# Patient Record
Sex: FEMALE | Race: WHITE
[De-identification: names, ages, dates, MRNs, and addresses within clinical notes are randomized per-mention and may not be internally consistent; named-entity substitution may affect disease eponyms.]

---

## 2017-02-15 ENCOUNTER — HOSPITAL ENCOUNTER (EMERGENCY)
Dept: HOSPITAL 47 - EC | Age: 22
Discharge: HOME | End: 2017-02-15
Payer: COMMERCIAL

## 2017-02-15 VITALS
DIASTOLIC BLOOD PRESSURE: 70 MMHG | HEART RATE: 84 BPM | SYSTOLIC BLOOD PRESSURE: 106 MMHG | TEMPERATURE: 99.4 F | RESPIRATION RATE: 16 BRPM
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
## 2017-02-15 DIAGNOSIS — Z79.3: ICD-10-CM
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
## 2017-02-15 DIAGNOSIS — M54.6: ICD-10-CM

## 2017-02-15 DIAGNOSIS — S09.90XA: Primary | ICD-10-CM

## 2017-02-15 DIAGNOSIS — M54.5: ICD-10-CM

## 2017-02-15 PROCEDURE — 99284 EMERGENCY DEPT VISIT MOD MDM: CPT

## 2017-02-15 PROCEDURE — 72072 X-RAY EXAM THORAC SPINE 3VWS: CPT

## 2017-02-15 NOTE — XR
Thoracic spine

 

HISTORY: Trauma and pain

 

3 views of the thoracic spine

 

No comparisons

 

There is a gentle spinal curvature. Thoracic vertebral bodies show preserved height, alignment, and b
one mineralization. Disc spaces are maintained. No paraspinal mass.

 

IMPRESSION: No acute abnormalities evident, no acute fracture or subluxation

## 2017-02-15 NOTE — ED
Fall HPI





- General


Chief Complaint: Fall


Stated Complaint: headache


Time Seen by Provider: 02/15/17 08:13


Source: patient, RN notes reviewed


Mode of arrival: ambulatory





- History of Present Illness


Initial Comments: 





Patient is a 21-year-old female presents to the emergency room for evaluation 

of fall injury.  Patient states she was walking to her car about an hour ago 

slipped on the ice and fell backwards landing on her mid thoracic back and 

hitting the back of her head.  Patient denies loss of consciousness.  Patient 

states she's having a 7 out of 10 headache.  Patient denies neck pain, numbness 

or tingling in extremities, nausea, vomiting.  Patient also stated she's having 

pain over her thoracic spine after the fall.  Patient denies saddle anesthesia 

or urinary/fecal incontinence.  Patient's changes in vision.  Patient denies 

taking anything for her symptoms.  Patient does states she's having slight 

ringing in her left ear after the incident.  Patient denies any other injuries 

during incident.





- Related Data


 Home Medications











 Medication  Instructions  Recorded  Confirmed


 


Etonogestrel/Ethinyl Estradiol 1 ring VG Q21D 02/15/17 02/15/17





[Nuvaring Vaginal Ring]   











 Allergies











Allergy/AdvReac Type Severity Reaction Status Date / Time


 


No Known Allergies Allergy   Verified 02/15/17 08:12














Review of Systems


ROS Statement: 


Those systems with pertinent positive or pertinent negative responses have been 

documented in the HPI.





ROS Other: All systems not noted in ROS Statement are negative.





Past Medical History


Past Medical History: Musculoskeletal Disorder


Additional Past Medical History / Comment(s): HX MIXED CONNECTIVE TISSUE 

DISORDER. SYNCOPAL EPISODES SINCE AUG. 2014


History of Any Multi-Drug Resistant Organisms: None Reported


Past Surgical History: Appendectomy


Past Anesthesia/Blood Transfusion Reactions: Motion Sickness


Past Psychological History: No Psychological Hx Reported


Smoking Status: Never smoker


Past Alcohol Use History: None Reported


Past Drug Use History: None Reported





- Past Family History


  ** Mother


Family Medical History: Deep Vein Thrombosis (DVT)





General Exam





- General Exam Comments


Initial Comments: 





Sitting in exam room in no acute distress.


Limitations: no limitations


General appearance: alert, in no apparent distress


Head exam: Present: atraumatic, normocephalic, normal inspection


Eye exam: Present: normal appearance, PERRL, EOMI


Pupils: Present: normal accommodation


ENT exam: Present: normal exam, normal oropharynx, mucous membranes moist, TM's 

normal bilaterally, normal external ear exam


Neck exam: Present: normal inspection, full ROM.  Absent: tenderness, 

lymphadenopathy


Respiratory exam: Present: normal lung sounds bilaterally.  Absent: respiratory 

distress


Cardiovascular Exam: Present: regular rate, normal rhythm, normal heart sounds


Extremities exam: Present: normal inspection


Back exam: Present: normal inspection, vertebral tenderness.  Absent: muscle 

spasm, paraspinal tenderness (Thoracic spine tenderness)


Neurological exam: Present: alert, oriented X3, CN II-XII intact, normal gait


  ** Expanded


Patient oriented to: Present: person, place, time


Speech: Present: fluid speech


Cranial nerves: EOM's Intact: Normal, Facial Sensation: Normal


Sensory exam: Upper Extremity Light Touch: Normal, Lower Extremity Light Touch: 

Normal


Motor strength exam: RUE: 5, LUE: 5, RLE: 5, LLE: 5


Eye Response: (4) open spontaneously


Motor Response: (6) obeys commands


Verbal Response: (5) oriented


Psychiatric exam: Present: normal affect, normal mood


Skin exam: Present: warm, dry, intact, normal color.  Absent: rash





Course


 Vital Signs











  02/15/17





  08:01


 


Temperature 97.8 F


 


Pulse Rate 87


 


Respiratory 20





Rate 


 


Blood Pressure 139/94


 


O2 Sat by Pulse 99





Oximetry 














Medical Decision Making





- Medical Decision Making





Patient is a 21-year-old female presents to the emergency room for evaluation 

of fall injury.  Thoracic spine x-ray negative for acute findings.  Patient 

states she's feeling better after Tylenol given.  Discussed benefits and risks 

of brain CT and patient declined at this time.  Patient states she understands 

everything that was discussed with her.  Return parameters discussed.  Case 

discussed with Dr. Cisneros.





- Radiology Data


Radiology results: report reviewed, image reviewed





Disposition


Clinical Impression: 


 Fall, Thoracic back pain





Disposition: HOME SELF-CARE


Condition: Good


Instructions:  Head Injury (ED), Acute Low Back Pain (ED)


Additional Instructions: 


Take Tylenol or Motrin as needed for pain.  Ice on and off for 10-15 minutes at 

a time for the next 24-48 hours. Please follow up with primary care provider in 

1-2 days. If any new symptom arises or symptoms worsen, return to ER as soon as 

possible.  


Referrals: 


Lamine Liz MD [Primary Care Provider] - 1-2 days


Time of Disposition: 10:12

## 2017-11-28 ENCOUNTER — HOSPITAL ENCOUNTER (EMERGENCY)
Dept: HOSPITAL 47 - EC | Age: 22
Discharge: HOME | End: 2017-11-28
Payer: COMMERCIAL

## 2017-11-28 VITALS
DIASTOLIC BLOOD PRESSURE: 68 MMHG | HEART RATE: 101 BPM | RESPIRATION RATE: 16 BRPM | SYSTOLIC BLOOD PRESSURE: 111 MMHG | TEMPERATURE: 97.8 F

## 2017-11-28 DIAGNOSIS — R00.0: ICD-10-CM

## 2017-11-28 DIAGNOSIS — Z79.3: ICD-10-CM

## 2017-11-28 DIAGNOSIS — R07.89: ICD-10-CM

## 2017-11-28 DIAGNOSIS — R55: Primary | ICD-10-CM

## 2017-11-28 DIAGNOSIS — Z91.018: ICD-10-CM

## 2017-11-28 LAB
ALP SERPL-CCNC: 73 U/L (ref 38–126)
ALT SERPL-CCNC: 35 U/L (ref 9–52)
ANION GAP SERPL CALC-SCNC: 10 MMOL/L
APTT BLD: 25.3 SEC (ref 22–30)
AST SERPL-CCNC: 30 U/L (ref 14–36)
BASOPHILS # BLD AUTO: 0 K/UL (ref 0–0.2)
BASOPHILS NFR BLD AUTO: 0 %
BUN SERPL-SCNC: 9 MG/DL (ref 7–17)
CALCIUM SPEC-MCNC: 10 MG/DL (ref 8.4–10.2)
CH: 28.1
CHCM: 33
CHLORIDE SERPL-SCNC: 105 MMOL/L (ref 98–107)
CK SERPL-CCNC: 66 U/L (ref 30–135)
CO2 SERPL-SCNC: 25 MMOL/L (ref 22–30)
EOSINOPHIL # BLD AUTO: 0.2 K/UL (ref 0–0.7)
EOSINOPHIL NFR BLD AUTO: 3 %
ERYTHROCYTE [DISTWIDTH] IN BLOOD BY AUTOMATED COUNT: 4.96 M/UL (ref 3.8–5.4)
ERYTHROCYTE [DISTWIDTH] IN BLOOD: 13.5 % (ref 11.5–15.5)
GLUCOSE SERPL-MCNC: 89 MG/DL (ref 74–99)
HCT VFR BLD AUTO: 42.4 % (ref 34–46)
HDW: 2.3
HGB BLD-MCNC: 13.8 GM/DL (ref 11.4–16)
INR PPP: 1 (ref ?–1.2)
LUC NFR BLD AUTO: 2 %
LYMPHOCYTES # SPEC AUTO: 3.2 K/UL (ref 1–4.8)
LYMPHOCYTES NFR SPEC AUTO: 37 %
MAGNESIUM SPEC-SCNC: 1.9 MG/DL (ref 1.6–2.3)
MCH RBC QN AUTO: 27.7 PG (ref 25–35)
MCHC RBC AUTO-ENTMCNC: 32.4 G/DL (ref 31–37)
MCV RBC AUTO: 85.5 FL (ref 80–100)
MONOCYTES # BLD AUTO: 0.6 K/UL (ref 0–1)
MONOCYTES NFR BLD AUTO: 7 %
NEUTROPHILS # BLD AUTO: 4.5 K/UL (ref 1.3–7.7)
NEUTROPHILS NFR BLD AUTO: 51 %
NON-AFRICAN AMERICAN GFR(MDRD): >60
POTASSIUM SERPL-SCNC: 3.9 MMOL/L (ref 3.5–5.1)
PROT SERPL-MCNC: 8 G/DL (ref 6.3–8.2)
PT BLD: 10.4 SEC (ref 9–12)
SODIUM SERPL-SCNC: 140 MMOL/L (ref 137–145)
TROPONIN I SERPL-MCNC: <0.012 NG/ML (ref 0–0.03)
WBC # BLD AUTO: 0.19 10*3/UL
WBC # BLD AUTO: 8.7 K/UL (ref 3.8–10.6)
WBC (PEROX): 8.83

## 2017-11-28 PROCEDURE — 96374 THER/PROPH/DIAG INJ IV PUSH: CPT

## 2017-11-28 PROCEDURE — 85025 COMPLETE CBC W/AUTO DIFF WBC: CPT

## 2017-11-28 PROCEDURE — 85730 THROMBOPLASTIN TIME PARTIAL: CPT

## 2017-11-28 PROCEDURE — 80053 COMPREHEN METABOLIC PANEL: CPT

## 2017-11-28 PROCEDURE — 83735 ASSAY OF MAGNESIUM: CPT

## 2017-11-28 PROCEDURE — 96361 HYDRATE IV INFUSION ADD-ON: CPT

## 2017-11-28 PROCEDURE — 93005 ELECTROCARDIOGRAM TRACING: CPT

## 2017-11-28 PROCEDURE — 99285 EMERGENCY DEPT VISIT HI MDM: CPT

## 2017-11-28 PROCEDURE — 82550 ASSAY OF CK (CPK): CPT

## 2017-11-28 PROCEDURE — 85610 PROTHROMBIN TIME: CPT

## 2017-11-28 PROCEDURE — 36415 COLL VENOUS BLD VENIPUNCTURE: CPT

## 2017-11-28 PROCEDURE — 82553 CREATINE MB FRACTION: CPT

## 2017-11-28 PROCEDURE — 71020: CPT

## 2017-11-28 PROCEDURE — 84484 ASSAY OF TROPONIN QUANT: CPT

## 2017-11-28 PROCEDURE — 84703 CHORIONIC GONADOTROPIN ASSAY: CPT

## 2017-11-28 PROCEDURE — 85379 FIBRIN DEGRADATION QUANT: CPT

## 2017-11-28 NOTE — ED
Chest Pain HPI





- General


Chief Complaint: Chest Pain


Stated Complaint: Chest Pain/Diff Breathing


Time Seen by Provider: 11/28/17 18:35


Source: patient


Mode of arrival: ambulatory


Limitations: no limitations





- History of Present Illness


Initial Comments: 





This 22-year-old white female presents with a complaint of some chest pain.  

She states that it is in the left side of her chest.  It is reproducible she 

presses on her sternum.  It is a pressure type of pain.  She states that it is 

been intermittent for the last 6 days.  It occurred today again while she was 

at work.  She states that she feels somewhat dizzy or lightheaded at times as 

well.  She denies any extremity paresthesias.  She denies any history of 

anxiety and does not feel anxious currently.  She apparently had similar 

incidents approximately 3 years ago and was fully worked up at that time 

without a known cause and it spontaneously resolved.  She denies any leg pain 

or swelling or history DVT or PE.  No known cardiac abnormalities.  She also 

relates that she had a syncopal episode approximately 6 days ago.  No other 

complaints or modifying factors.





- Related Data


 Home Medications











 Medication  Instructions  Recorded  Confirmed


 


Etonogestrel/Ethinyl Estradiol 1 ring VG Q21D 02/15/17 11/28/17





[Nuvaring Vaginal Ring]   


 


Ibuprofen [Motrin] 600 mg PO BID PRN 11/28/17 11/28/17








 Previous Rx's











 Medication  Instructions  Recorded


 


Ibuprofen [Motrin] 600 mg PO Q6HR PRN #30 tab 11/28/17











 Allergies











Allergy/AdvReac Type Severity Reaction Status Date / Time


 


tree nut Allergy  Rash/Hives Verified 11/28/17 19:08














Review of Systems


ROS Statement: 


Those systems with pertinent positive or pertinent negative responses have been 

documented in the HPI.





ROS Other: All systems not noted in ROS Statement are negative.





Past Medical History


Past Medical History: Musculoskeletal Disorder


Additional Past Medical History / Comment(s): HX MIXED CONNECTIVE TISSUE 

DISORDER. SYNCOPAL EPISODES SINCE AUG. 2014


History of Any Multi-Drug Resistant Organisms: None Reported


Past Surgical History: Appendectomy


Past Anesthesia/Blood Transfusion Reactions: Motion Sickness


Past Psychological History: No Psychological Hx Reported


Smoking Status: Never smoker


Past Alcohol Use History: Rare


Past Drug Use History: None Reported





- Past Family History


  ** Mother


Family Medical History: Deep Vein Thrombosis (DVT)





General Exam





- General Exam Comments


Initial Comments: 





GENERAL: The patient is well nourished and well hydrated. 


VITAL SIGNS: Heart rate, blood pressure, respiratory rate reviewed as recorded 

in nurse's notes. 


EYES: Pupils are round and reactive. Extraocular movements are intact. No 

conjunctival / lid redness or swelling. 


ENT: No external evidence of injury, swelling, or ecchymosis. Airway is patent. 

Throat is clear. 


NECK: Nontender. No swelling or evidence of injury. No subcutaneous emphysema. 

Trachea is midline. No thyroid mass. 


HEART: Tachycardic heart rate.  Good peripheral pulses. 


LUNGS/CHEST: Breath sounds clear and equal bilaterally. No rales, rhonchi, or 

wheezes. No ecchymosis, subcutaneous emphysema.  There is some slight 

tenderness upon palpation of the mid sternum.


ABDOMEN: Abdomen soft without tenderness. No palpable masses or organomegaly. 

No peritoneal signs. No abdominal wall swelling or ecchymosis. 


EXTREMITIES: No extremity tenderness. Normal muscle tone and function. No 

thoracolumbar tenderness. 


NEUROLOGIC: Sensation is grossly intact. Cranial nerve exam reveals face is 

symmetrical, tongue is midline, speech is clear. 


SKIN: No abrasions or ecchymosis is noted. No induration or masses noted. 


PSYCHIATRIC: Alert and oriented. Appropriate behavior and judgment.





Limitations: no limitations





Course


 Vital Signs











  11/28/17 11/28/17





  18:02 18:30


 


Temperature 98.6 F 


 


Pulse Rate 109 H 120 H


 


Pulse Rate [  120 H





Cardiac Monitor  





]  


 


Respiratory 16 18





Rate  


 


Blood Pressure 147/90 143/87


 


O2 Sat by Pulse 100 100





Oximetry  














Chest Pain MDM





- MDM





The patient was seen and examined.  All diagnostics were reviewed.  An IV was 

started and she was placed on a cardiac monitor.  The monitor shows a sinus 

tachycardia with no ectopy otherwise noted.  His receive an aspirin.  The 

possibility of anxiety is questionable and therefore she is given 1 mg of 

Ativan IV to see if she does have any relief of symptoms with this medication.  

The EKG shows a sinus tachycardia at a rate of 107.  There is no acute ST-T 

wave changes identified.  The OH intervals 1.2, QRS duration is 80, and the QTC 

intervals 459.  Chest x-ray did not show any acute abnormalities.  The cardiac 

profile labs as well as d-dimer are all negative or sensory within normal 

limits.  The exact cause of her pain is not definitively determined.  She did 

try the Ativan and this did not seem to cause any relief of symptoms.  She 

certainly does have a reproducible component of her chest pain and is felt as 

though it likely could be musculoskeletal in nature.  She did have a syncopal 

episode approximate 6 days ago and the exact cause of this is not determined as 

well.  Is felt as though she may benefit from follow-up with cardiology.  She 

apparently did see him 3 years ago as well.  It is felt as though she is stable 

for discharge and return parameters are discussed.





Disposition


Clinical Impression: 


 Syncope, Lightheadedness, Sinus tachycardia, Musculoskeletal chest pain





Disposition: HOME SELF-CARE


Condition: Good


Instructions:  Chest Wall Pain (ED), Syncope (ED)


Prescriptions: 


Ibuprofen [Motrin] 600 mg PO Q6HR PRN #30 tab


 PRN Reason: Pain


Referrals: 


None,Stated [Primary Care Provider] - 1-2 days


Georgie Flores MD [STAFF PHYSICIAN] - 12/05/17


Time of Disposition: 19:31

## 2017-11-28 NOTE — XR
EXAMINATION TYPE: XR chest 2V

 

DATE OF EXAM: 11/28/2017

 

COMPARISON: NONE

 

HISTORY: Chest pain

 

TECHNIQUE:  Frontal and lateral views of the chest are obtained.

 

FINDINGS:  Heart and mediastinum are normal. Lungs are clear. Diaphragm is normal. Bony thorax is int
act. There are chest leads.

 

IMPRESSION:  Normal chest

## 2018-01-13 ENCOUNTER — HOSPITAL ENCOUNTER (EMERGENCY)
Dept: HOSPITAL 47 - EC | Age: 23
Discharge: HOME | End: 2018-01-13
Payer: COMMERCIAL

## 2018-01-13 VITALS
TEMPERATURE: 98.2 F | HEART RATE: 85 BPM | DIASTOLIC BLOOD PRESSURE: 90 MMHG | RESPIRATION RATE: 18 BRPM | SYSTOLIC BLOOD PRESSURE: 141 MMHG

## 2018-01-13 DIAGNOSIS — Z79.3: ICD-10-CM

## 2018-01-13 DIAGNOSIS — R30.0: ICD-10-CM

## 2018-01-13 DIAGNOSIS — R10.2: Primary | ICD-10-CM

## 2018-01-13 DIAGNOSIS — Z91.018: ICD-10-CM

## 2018-01-13 DIAGNOSIS — Z90.49: ICD-10-CM

## 2018-01-13 LAB
PH UR: 6 [PH] (ref 5–8)
SP GR UR: 1.02 (ref 1–1.03)
UROBILINOGEN UR QL STRIP: <2 MG/DL (ref ?–2)

## 2018-01-13 PROCEDURE — 81025 URINE PREGNANCY TEST: CPT

## 2018-01-13 PROCEDURE — 81003 URINALYSIS AUTO W/O SCOPE: CPT

## 2018-01-13 PROCEDURE — 99283 EMERGENCY DEPT VISIT LOW MDM: CPT

## 2018-01-13 NOTE — ED
Female Urogenital HPI





- General


Chief complaint: Urogenital


Stated complaint: Bladder Infection


Time Seen by Provider: 01/13/18 22:35


Source: patient


Mode of arrival: ambulatory


Limitations: no limitations





- History of Present Illness


Initial comments: 





This patient is 22-year-old woman who presents to be evaluated for pelvic pain 

and dysuria.  The patient states that the pain began couple of days ago.  She 

states that she went to the clinic, where she was diagnosed with a urinary 

tract infection.  She states she was given course of Macrobid, but she has not 

had any improvement with the symptoms, and may be a touch worse.  The patient 

has not had fever or chills.  No vomiting or change in bowel movements.  She 

denies vaginal discharge.


MD Complaint: dysuria, pelvic pain


-: days(s)


Location: perineum, suprapubic


Radiation: non-radiating


Severity: moderate


Quality: sharp


Consistency: constant


Improves with: none


Worsens with: urination


Last Menstrual Period: 12/25/17


Patient Pregnant: No





- Related Data


 Home Medications











 Medication  Instructions  Recorded  Confirmed


 


Etonogestrel/Ethinyl Estradiol 1 ring VG Q21D 02/15/17 01/13/18





[Nuvaring Vaginal Ring]   











 Allergies











Allergy/AdvReac Type Severity Reaction Status Date / Time


 


tree nut Allergy  Rash/Hives Verified 01/13/18 22:18














Review of Systems


ROS Statement: 


Those systems with pertinent positive or pertinent negative responses have been 

documented in the HPI.





ROS Other: All systems not noted in ROS Statement are negative.


Constitutional: Denies: fever, chills


Respiratory: Denies: cough, dyspnea


Cardiovascular: Denies: chest pain, edema


Gastrointestinal: Reports: as per HPI, abdominal pain.  Denies: nausea, vomiting

, diarrhea


Genitourinary: Reports: dysuria, other (States her urine is darker).  Denies: 

frequency, hematuria, discharge, abnormal menses


Musculoskeletal: Denies: back pain


Skin: Denies: rash


Neurological: Denies: headache, weakness, numbness





Past Medical History


Past Medical History: Musculoskeletal Disorder


Additional Past Medical History / Comment(s): HX MIXED CONNECTIVE TISSUE 

DISORDER. SYNCOPAL EPISODES SINCE AUG. 2014


History of Any Multi-Drug Resistant Organisms: None Reported


Past Surgical History: Appendectomy


Past Anesthesia/Blood Transfusion Reactions: Motion Sickness


Past Psychological History: No Psychological Hx Reported


Smoking Status: Never smoker


Past Alcohol Use History: Rare


Past Drug Use History: None Reported





- Past Family History


  ** Mother


Family Medical History: Deep Vein Thrombosis (DVT)





General Exam


Limitations: no limitations


General appearance: alert, in no apparent distress


Head exam: Present: atraumatic, normocephalic


Eye exam: Present: normal appearance.  Absent: scleral icterus, conjunctival 

injection


Neck exam: Present: normal inspection


Respiratory exam: Present: normal lung sounds bilaterally.  Absent: respiratory 

distress, wheezes, rales, rhonchi, stridor


Cardiovascular Exam: Present: regular rate, normal rhythm, normal heart sounds


GI/Abdominal exam: Present: soft, normal bowel sounds.  Absent: distended, 

tenderness, guarding, rebound, rigid, mass, bruit, pulsatile mass, hernia


Extremities exam: Present: normal inspection, normal capillary refill.  Absent: 

pedal edema, calf tenderness


Back exam: Present: normal inspection.  Absent: CVA tenderness (R), CVA 

tenderness (L)


Neurological exam: Present: alert


Skin exam: Present: warm, dry, intact, normal color.  Absent: rash





Course


 Vital Signs











  01/13/18





  22:17


 


Temperature 98.2 F


 


Pulse Rate 85


 


Respiratory 18





Rate 


 


Blood Pressure 141/90


 


O2 Sat by Pulse 98





Oximetry 














Medical Decision Making





- Medical Decision Making





Patient is a 22-year-old woman with pelvic pains and dysuria.  Her urine did 

come back unremarkable here.  I had further discussion with the patient, 

including that pelvic examination is part of the full workup for her symptoms, 

and the patient wants to follow with her gynecologist.  She does not want to 

have pelvic exam or swabs performed here.  She will does agree to return if 

there is any worsening in her symptoms or if there is difficulty in 

establishing the follow-up.  Discussed other return parameters.





- Lab Data


 Lab Results











  01/13/18 01/13/18 Range/Units





  22:47 22:47 


 


Urine Color   Dark Brown  


 


Urine Appearance   Clear  (Clear)  


 


Urine pH   6.0  (5.0-8.0)  


 


Ur Specific Gravity   1.019  (1.001-1.035)  


 


Urine Protein   Trace H  (Negative)  


 


Urine Glucose (UA)   Negative  (Negative)  


 


Urine Ketones   Negative  (Negative)  


 


Urine Blood   Negative  (Negative)  


 


Urine Nitrite   Negative  (Negative)  


 


Urine Bilirubin   Negative  (Negative)  


 


Urine Urobilinogen   <2.0  (<2.0)  mg/dL


 


Ur Leukocyte Esterase   Negative  (Negative)  


 


Urine HCG, Qual  Not Detected   (Not Detectd)  














Disposition


Clinical Impression: 


 Pelvic pain





Disposition: HOME SELF-CARE


Condition: Fair


Instructions:  Pelvic Pain (ED)


Referrals: 


Gilma Maldonado MD [Primary Care Provider] - 1-2 days


Kiersten Simmons DO [Doctor of Osteopathic Medicine] - 1-2 days

## 2018-02-26 ENCOUNTER — HOSPITAL ENCOUNTER (EMERGENCY)
Dept: HOSPITAL 47 - EC | Age: 23
Discharge: HOME | End: 2018-02-26
Payer: COMMERCIAL

## 2018-02-26 VITALS
DIASTOLIC BLOOD PRESSURE: 91 MMHG | TEMPERATURE: 98 F | HEART RATE: 109 BPM | SYSTOLIC BLOOD PRESSURE: 135 MMHG | RESPIRATION RATE: 17 BRPM
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
## 2018-02-26 DIAGNOSIS — Z91.018: ICD-10-CM

## 2018-02-26 DIAGNOSIS — Z79.3: ICD-10-CM
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
## 2018-02-26 DIAGNOSIS — Z88.1: ICD-10-CM

## 2018-02-26 DIAGNOSIS — S61.217A: Primary | ICD-10-CM
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
## 2018-02-26 PROCEDURE — 12001 RPR S/N/AX/GEN/TRNK 2.5CM/<: CPT

## 2018-02-26 PROCEDURE — 99283 EMERGENCY DEPT VISIT LOW MDM: CPT

## 2018-02-26 NOTE — ED
Upper Extremity HPI





- General


Chief Complaint: Extremity Injury, Upper


Stated Complaint: LEFT PINKIE FINGER LACERATION


Time Seen by Provider: 02/26/18 10:09


Source: patient, RN notes reviewed


Mode of arrival: ambulatory


Limitations: no limitations





- History of Present Illness


Initial Comments: 





22-year-old female with a lumbar spine with chief complaint of finger 

laceration.  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 

she was hanging up her left breast and cut her finger on an object.  She states 

she is right-hand-dominant she is up-to-date on her tetanus.  Patient 

thoroughly washout the cut.





- Related Data


 Home Medications











 Medication  Instructions  Recorded  Confirmed


 


Etonogestrel/Ethinyl Estradiol 1 ring VG Q21D 02/15/17 01/13/18





[Nuvaring Vaginal Ring]   


 


Ibuprofen [Motrin Ib] 400 mg PO Q6HR PRN 02/26/18 02/26/18











 Allergies











Allergy/AdvReac Type Severity Reaction Status Date / Time


 


nitrofurantoin Allergy  Rash/Hives Verified 02/26/18 10:44





[From Macrobid]     


 


tree nut Allergy  Rash/Hives Verified 02/26/18 10:44














Review of Systems


ROS Statement: 


Those systems with pertinent positive or pertinent negative responses have been 

documented in the HPI.





ROS Other: All systems not noted in ROS Statement are negative.





Past Medical History


Past Medical History: Musculoskeletal Disorder


Additional Past Medical History / Comment(s): HX MIXED CONNECTIVE TISSUE 

DISORDER. SYNCOPAL EPISODES SINCE AUG. 2014


History of Any Multi-Drug Resistant Organisms: None Reported


Past Surgical History: Appendectomy


Past Anesthesia/Blood Transfusion Reactions: Motion Sickness


Past Psychological History: No Psychological Hx Reported


Smoking Status: Never smoker


Past Alcohol Use History: Rare


Past Drug Use History: None Reported





- Past Family History


  ** Mother


Family Medical History: Deep Vein Thrombosis (DVT)





General Exam


Limitations: no limitations


General appearance: alert, in no apparent distress


Respiratory exam: Present: normal lung sounds bilaterally.  Absent: respiratory 

distress, wheezes, rales, rhonchi, stridor


Cardiovascular Exam: Present: regular rate, normal rhythm, normal heart sounds.

  Absent: systolic murmur, diastolic murmur, rubs, gallop, clicks


Extremities exam: Present: other (Left hand fifth digit there is a U-shaped 

laceration over the PIP approximately 1 cm.  Patient has full range of motion 

neurovascular intact there is no tendon involvement)





Course


 Vital Signs











  02/26/18





  09:57


 


Temperature 98.0 F


 


Pulse Rate 109 H


 


Respiratory 17





Rate 


 


Blood Pressure 135/91


 


O2 Sat by Pulse 100





Oximetry 














Procedures





- Laceration


  ** Laceration #1


Consent Obtained: verbal consent


Indication: laceration


Site: upper extremity (Finger laceration fifth digit left hand)


Size (cm): 1


Description: flap


Depth: simple, single layer


Anesthetic Used: lidocaine 1%, without epi


Anesthesia Technique: local infiltration


Amount (mls): 2


Pre-repair: wound explored, irrigated extensively, deep structures intact


Type of Sutures: nylon


Size of Sutures: 4-0


Number of Sutures: 3


Technique: simple, interrupted


Patient Tolerated Procedure: well, no complications





Medical Decision Making





- Medical Decision Making





22-year-old female presented for finger laceration.  This was closed using 3 

sutures.  Patient tolerated well there is no comp medications patient was 

discharged.





Disposition


Clinical Impression: 


 Laceration of finger of left hand





Disposition: HOME SELF-CARE


Condition: Stable


Instructions:  Care For Your Stitches (ED), Laceration (ED)


Additional Instructions: 


Please return to the Emergency Department if symptoms worsen or any other 

concerns.  Have sutures removed in 10 days.


Referrals: 


Gilma Maldonado MD [Primary Care Provider] - 1-2 days


Time of Disposition: 10:42